# Patient Record
Sex: MALE | Race: WHITE | Employment: OTHER | URBAN - METROPOLITAN AREA
[De-identification: names, ages, dates, MRNs, and addresses within clinical notes are randomized per-mention and may not be internally consistent; named-entity substitution may affect disease eponyms.]

---

## 2020-08-14 NOTE — PROGRESS NOTES
Spoke with  at [facility] regarding COVID-19 testing needed prior to procedure on 08/19/2020. Nurse stated it was cancelled because COVID test done to early. Procedure date rescheduled to Wednesday 8/26/2020.

## 2020-08-25 ENCOUNTER — ANESTHESIA EVENT (OUTPATIENT)
Dept: ENDOSCOPY | Age: 75
End: 2020-08-25
Payer: MEDICARE

## 2020-08-25 NOTE — ANESTHESIA PRE PROCEDURE
Department of Anesthesiology  Preprocedure Note       Name:  Rox Rutherford   Age:  70 y.o.  :  1945                                          MRN:  [id number]         Date:  2020      Surgeon:  Colby Villafana MD    Procedure: SCREENING COLON W/ANES.     HPI:  The patient is a 73 y.o. male NH resident with PMH brainstem CVA, HTN, DM. Medications prior to admission:    losartan (COZAAR) 50 MG tablet Take 2 tablets by mouth daily   bismuth subsalicylate (PEPTO BISMOL) 262 MG/15ML suspension Take 30 mLs by mouth every 4 hours as needed for Nausea (and vomiting)   artificial tears (ARTIFICIAL TEARS) OINT Apply to eye 2 times daily Apply to left eye twice daily. QUEtiapine (SEROQUEL) 50 MG tablet Take 50 mg by mouth nightly   glucose (GLUTOSE) 40 % GEL Take 15 g by mouth as needed   fexofenadine (ALLEGRA) 180 MG tablet Take 180 mg by mouth as needed   metFORMIN (GLUCOPHAGE) 1000 MG tablet Take 1,000 mg by mouth 2 times daily (with meals)   amLODIPine (NORVASC) 10 MG tablet Take 1 tablet by mouth daily. loratadine (CLARITIN) 10 MG tablet Take 10 mg by mouth daily. Acetaminophen (TYLENOL PO) Take 650 mg by mouth 4 times daily. As needed    bisacodyl (DULCOLAX) 5 MG EC tablet Take 10 mg by mouth daily as needed. glucagon hcl, rDNA, (GLUCAGEN HYPOKIT) 1 MG SOLR Inject 1 mg into the muscle once. Indications: as needed for FBSS <60   aspirin EC 81 MG EC tablet Take 81 mg by mouth daily. citalopram (CELEXA) 20 MG tablet Take 40 mg by mouth daily    glyBURIDE (DIABETA) 5 MG tablet Take 5 mg by mouth 2 times daily (with meals). hydrochlorothiazide (MICROZIDE) 12.5 MG capsule Take 12.5 mg by mouth daily. omeprazole (PRILOSEC) 20 MG capsule Take 20 mg by mouth daily. senna (SENNA CONCENTRATE) 8.6 MG tablet Take 2 tablets by mouth daily. Insulin Regular Human (NOVOLIN R INNOLET IJ) Inject  as directed.  Per sliding scale    insulin glargine (LANTUS) 100 UNIT/ML injection Inject 48 Units into the skin nightly    Vitamin E-Vit  A & D OINT Apply 1 each topically. q shift and as needed    divalproex (DEPAKOTE) 250 MG EC tablet Take 250 mg by mouth 2 times daily.      Allergies:  No Known Allergies    Problem List:     Cerebral artery occlusion with [facility] infarction [facility]    GERD (gastroesophageal reflux disease)    Hypertension    Depressed    Back pain, chronic    Diabetes mellitus type 2, uncontrolled ([facility]    HCAP (healthcare-associated pneumonia)    Fever    Rash    Metabolic encephalopathy    Altered mental status    [facility]    Sepsis due to Escherichia coli [facility])     Past Medical History:     Back pain, chronic     Bipolar 1 disorder ([facility] 12/16/2010    Cancer ([facility]     prostate    CVA (cerebral infarction)     brain stem CVA    Depressed     Diabetes mellitus type 2, uncontrolled (HCC)     Erosive esophagitis     GERD (gastroesophageal reflux disease)     Hydrocephalus     Hypertension     Influenza A 0/48/08    Metabolic encephalopathy 92/52/2281    Organic affective disorder     Organic delusional disorder     Unspecified cerebral artery occlusion with cerebral infarction ([facility]      Past Surgical History:     CRANIOTOMY      PROSTATECTOMY       Social History:    Tobacco Use    Smoking status: Never Smoker   Substance Use Topics    Alcohol use: No     Vital Signs (Current):    BP: 147/75  Pulse: 72    Resp: 16  SpO2: 95    Temp: 97 °F (36.1 °C)    Height: 6' (1.829 m)  (08/26/20)  Weight: 214 lb (97.1 kg)  (08/26/20)    BMI: 29.1            BP Readings from Last 3 Encounters:   12/05/16 (!) 149/84   04/17/16 146/78     NPO Status: >8 hrs                         BMI:   Wt Readings from Last 3 Encounters:   12/05/16 205 lb 1.6 oz (93 kg)       COVID-19 Screening (If Applicable): negative    Anesthesia Evaluation  Patient summary reviewed and Nursing notes reviewed  Airway: Mallampati: III  TM distance: >3 FB   Neck ROM: limited  Mouth opening: > = 3 FB Dental:          Pulmonary:                              Cardiovascular:  Exercise tolerance: poor (<4 METS),   (+) hypertension:,                   Neuro/Psych:   (+) CVA:, psychiatric history:             ROS comment: + PMH brainstem CVA    Bipolar D/O GI/Hepatic/Renal:   (+) GERD:, PUD,           Endo/Other:    (+) DiabetesType II DM, , .                 Abdominal:           Vascular:                                      Anesthesia Plan      general and TIVA     ASA 4       Induction: intravenous. Anesthetic plan and risks discussed with patient. Plan discussed with CRNA.             To Oreilly MD

## 2020-08-25 NOTE — PROGRESS NOTES
PAT call for Endoscopy procedure scheduled for 8/27/2020. Spoke with Amy at [facility] regarding arrival time of 0900, NPO after am dose prep completed. [facility] will be faxing COVID test results and medication list to department, fax number provided.

## 2020-08-26 ENCOUNTER — ANESTHESIA (OUTPATIENT)
Dept: ENDOSCOPY | Age: 75
End: 2020-08-26
Payer: MEDICARE

## 2020-08-26 ENCOUNTER — HOSPITAL ENCOUNTER (OUTPATIENT)
Age: 75
Setting detail: OUTPATIENT SURGERY
Discharge: HOME | End: 2020-08-26
Attending: INTERNAL MEDICINE | Admitting: INTERNAL MEDICINE
Payer: MEDICARE

## 2020-08-26 VITALS
SYSTOLIC BLOOD PRESSURE: 158 MMHG | WEIGHT: 214 LBS | HEART RATE: 80 BPM | HEIGHT: 72 IN | DIASTOLIC BLOOD PRESSURE: 77 MMHG | OXYGEN SATURATION: 95 % | TEMPERATURE: 98.1 F | RESPIRATION RATE: 14 BRPM | BODY MASS INDEX: 28.99 KG/M2

## 2020-08-26 LAB
GLUCOSE BLD-MCNC: 149 MG/DL (ref 70–99)
PERFORMED ON: ABNORMAL

## 2020-08-26 PROCEDURE — 7100000010 HC PHASE II RECOVERY - FIRST 15 MIN: Performed by: INTERNAL MEDICINE

## 2020-08-26 PROCEDURE — 7100000011 HC PHASE II RECOVERY - ADDTL 15 MIN: Performed by: INTERNAL MEDICINE

## 2020-08-26 PROCEDURE — 6360000002 HC RX W HCPCS: Performed by: NURSE ANESTHETIST, CERTIFIED REGISTERED

## 2020-08-26 PROCEDURE — 3609010600 HC COLONOSCOPY POLYPECTOMY SNARE/COLD BIOPSY: Performed by: INTERNAL MEDICINE

## 2020-08-26 PROCEDURE — 3700000000 HC ANESTHESIA ATTENDED CARE: Performed by: INTERNAL MEDICINE

## 2020-08-26 PROCEDURE — 3700000001 HC ADD 15 MINUTES (ANESTHESIA): Performed by: INTERNAL MEDICINE

## 2020-08-26 PROCEDURE — 2580000003 HC RX 258: Performed by: ANESTHESIOLOGY

## 2020-08-26 PROCEDURE — 2500000003 HC RX 250 WO HCPCS: Performed by: NURSE ANESTHETIST, CERTIFIED REGISTERED

## 2020-08-26 PROCEDURE — 88305 TISSUE EXAM BY PATHOLOGIST: CPT

## 2020-08-26 PROCEDURE — 2709999900 HC NON-CHARGEABLE SUPPLY: Performed by: INTERNAL MEDICINE

## 2020-08-26 RX ORDER — SODIUM CHLORIDE, SODIUM LACTATE, POTASSIUM CHLORIDE, CALCIUM CHLORIDE 600; 310; 30; 20 MG/100ML; MG/100ML; MG/100ML; MG/100ML
INJECTION, SOLUTION INTRAVENOUS CONTINUOUS
Status: DISCONTINUED | OUTPATIENT
Start: 2020-08-26 | End: 2020-08-26 | Stop reason: HOSPADM

## 2020-08-26 RX ORDER — MECLIZINE HCL 12.5 MG/1
12.5 TABLET ORAL 3 TIMES DAILY PRN
COMMUNITY

## 2020-08-26 RX ORDER — LIDOCAINE HYDROCHLORIDE 10 MG/ML
0.3 INJECTION, SOLUTION EPIDURAL; INFILTRATION; INTRACAUDAL; PERINEURAL
Status: DISCONTINUED | OUTPATIENT
Start: 2020-08-26 | End: 2020-08-26 | Stop reason: HOSPADM

## 2020-08-26 RX ORDER — SODIUM CHLORIDE 0.9 % (FLUSH) 0.9 %
10 SYRINGE (ML) INJECTION PRN
Status: DISCONTINUED | OUTPATIENT
Start: 2020-08-26 | End: 2020-08-26 | Stop reason: HOSPADM

## 2020-08-26 RX ORDER — SODIUM CHLORIDE 0.9 % (FLUSH) 0.9 %
10 SYRINGE (ML) INJECTION EVERY 12 HOURS SCHEDULED
Status: DISCONTINUED | OUTPATIENT
Start: 2020-08-26 | End: 2020-08-26 | Stop reason: HOSPADM

## 2020-08-26 RX ORDER — LIDOCAINE HYDROCHLORIDE 20 MG/ML
INJECTION, SOLUTION EPIDURAL; INFILTRATION; INTRACAUDAL; PERINEURAL PRN
Status: DISCONTINUED | OUTPATIENT
Start: 2020-08-26 | End: 2020-08-26 | Stop reason: SDUPTHER

## 2020-08-26 RX ORDER — SODIUM CHLORIDE, SODIUM LACTATE, POTASSIUM CHLORIDE, CALCIUM CHLORIDE 600; 310; 30; 20 MG/100ML; MG/100ML; MG/100ML; MG/100ML
INJECTION, SOLUTION INTRAVENOUS CONTINUOUS
Status: DISCONTINUED | OUTPATIENT
Start: 2020-08-26 | End: 2020-08-26 | Stop reason: DRUGHIGH

## 2020-08-26 RX ORDER — PROPOFOL 10 MG/ML
INJECTION, EMULSION INTRAVENOUS PRN
Status: DISCONTINUED | OUTPATIENT
Start: 2020-08-26 | End: 2020-08-26 | Stop reason: SDUPTHER

## 2020-08-26 RX ORDER — TRAMADOL HYDROCHLORIDE 50 MG/1
50 TABLET ORAL EVERY 6 HOURS PRN
COMMUNITY

## 2020-08-26 RX ORDER — SODIUM PHOSPHATE, DIBASIC AND SODIUM PHOSPHATE, MONOBASIC 7; 19 G/133ML; G/133ML
1 ENEMA RECTAL
COMMUNITY

## 2020-08-26 RX ORDER — ALBUTEROL SULFATE 90 UG/1
2 AEROSOL, METERED RESPIRATORY (INHALATION) EVERY 6 HOURS PRN
COMMUNITY

## 2020-08-26 RX ORDER — GUAIFENESIN 100 MG/5ML
200 SOLUTION ORAL EVERY 4 HOURS PRN
COMMUNITY

## 2020-08-26 RX ORDER — SERTRALINE HYDROCHLORIDE 100 MG/1
100 TABLET, FILM COATED ORAL DAILY
COMMUNITY

## 2020-08-26 RX ADMIN — LIDOCAINE HYDROCHLORIDE 50 MG: 20 INJECTION, SOLUTION EPIDURAL; INFILTRATION; INTRACAUDAL; PERINEURAL at 10:12

## 2020-08-26 RX ADMIN — PROPOFOL 20 MG: 10 INJECTION, EMULSION INTRAVENOUS at 10:34

## 2020-08-26 RX ADMIN — SODIUM CHLORIDE, POTASSIUM CHLORIDE, SODIUM LACTATE AND CALCIUM CHLORIDE: 600; 310; 30; 20 INJECTION, SOLUTION INTRAVENOUS at 10:09

## 2020-08-26 RX ADMIN — PROPOFOL 100 MG: 10 INJECTION, EMULSION INTRAVENOUS at 10:23

## 2020-08-26 RX ADMIN — PROPOFOL 200 MG: 10 INJECTION, EMULSION INTRAVENOUS at 10:12

## 2020-08-26 ASSESSMENT — PAIN SCALES - GENERAL
PAINLEVEL_OUTOF10: 0
PAINLEVEL_OUTOF10: 0

## 2020-08-26 ASSESSMENT — PAIN - FUNCTIONAL ASSESSMENT: PAIN_FUNCTIONAL_ASSESSMENT: 0-10

## 2020-08-26 NOTE — OP NOTE
was performed to obtain adequate views. FINDINGS:  RECTUM:  The digital rectal exam was normal.  No masses were felt. COLON:  The visualized terminal ileum appeared normal.  There were three  semi-pedunculated 5 to 6 mm polyps in the ascending colon. All three  polyps were completely resected using snare polypectomy method. There  were six semi-pedunculated and sessile 4 to 6 mm polyps in the  transverse colon. All six polyps were completely resected using snare  polypectomy method. There was a large 1.2 cm semi-pedunculated polyp in  the descending colon. This was completely resected using hot snare  polypectomy method. There were two additional smaller 5 mm sessile  polyps in the sigmoid colon, also completely resected using snare  polypectomy method and placed in same specimen jar. The remaining  examined colon mucosa was examined and found to be normal without any  evidence of inflammation, ulcers, pseudomembranes, or masses. Retroflexed view of the rectum showed internal hemorrhoids. The entire  exam was limited by fair prep. SUMMARY  1.  Multiple medium-to-large colon polyps, resected and retrieved. 2.  Internal hemorrhoids. 3.  Fair prep. RECOMMENDATIONS:  1. Discharge the patient to home when standard parameters are met. 2.  Await pathology results. 3.  Avoid NSAIDs for 2 weeks. 4.  Repeat colonoscopy in 1 year. 5.  Follow up as needed.     EBL: < 5mL    Gilma Suarez MD    D: 08/26/2020 11:06:00       T: 08/26/2020 11:15:08     [email or web address]  Job#: [id number]     Doc#: [id number]    CC:  Jyoti Bourgeois MD

## 2020-08-26 NOTE — H&P
History and Physical / Pre-Sedation Assessment    Patient:  Jono Guzman   :   1945     Intended Procedure:  Colonoscopy    HPI: 70year old male with DM, GERD, CVA, bipolar do and depression presents for surveillance colonoscopy    Past Medical History:   Diagnosis Date    Back pain, chronic     B[facility] 1 disorder ([facility] 2010    Cancer ([facility]     prostate    CVA (cerebral infarction)     brain stem CVA    Depressed     Diabetes mellitus type 2, uncontrolled ([facility]     Erosive esophagitis     GERD (gastroesophageal reflux disease)     Hydrocephalus ([facility]     Hypertension     Influenza A     Metabolic encephalopathy     Organic affective disorder     Organic delusional disorder     Unspecified cerebral artery occlusion with cerebral infarction      Past Surgical History:   Procedure Laterality Date    CRANIOTOMY      PROSTATECTOMY         Medications reviewed  Prior to Admission medications    Medication Sig Start Date End Date Taking? Authorizing Provider   Sodium Phosphates (FLEET) 7-19 GM/118ML Place 1 enema rectally once as needed   Yes Historical Provider, MD   guaiFENesin (ROBITUSSIN) 100 MG/5ML SOLN oral solution Take 200 mg by mouth every 4 hours as needed for Cough   Yes Historical Provider, MD   meclizine (ANTIVERT) 12.5 MG tablet Take 12.5 mg by mouth 3 times daily as needed   Yes Historical Provider, MD   magnesium hydroxide (MILK OF MAGNESIA) 400 MG/5ML suspension Take by mouth daily as needed for Constipation   Yes Historical Provider, MD   insulin aspart (NOVOLOG) 100 UNIT/ML injection vial Inject 20 Units into the skin 3 times daily (before meals)   Yes Historical Provider, MD   sertraline (ZOLOFT) 100 MG tablet Take 100 mg by mouth daily   Yes Historical Provider, MD   traMADol (ULTRAM) 50 MG tablet Take 50 mg by mouth every 6 hours as needed for Pain.    Yes Historical Provider, MD   albuterol sulfate HFA (VENTOLIN HFA) 108 (90 Base) MCG/ACT inhaler patient with severe systemic disease that limits activity but is not incapacitating  Level of Sedation Plan: Moderate sedation  Post Procedure plan: Return to same level of care    I assessed the patient and find that the patient is in satisfactory condition to proceed with the planned procedure and sedation plan. I have explained the risk, benefits, and alternatives to the procedure; the patient understands and agrees to proceed.        Sarah Stevenson  8/26/2020

## 2020-08-26 NOTE — ANESTHESIA POSTPROCEDURE EVALUATION
Department of Anesthesiology  Postprocedure Note    Patient: Marivel Durant  MRN: [id number]  YOB: 1945  Date of evaluation: 8/26/2020    Procedure Summary     Date:  08/26/20 Room / Location:  [facility] 01 / [facility]    Anesthesia Start:  1008 Anesthesia Stop:  1050    Procedure:  COLONOSCOPY POLYPECTOMY SNARE/COLD BIOPSY (N/A ) Diagnosis:       Family history of colon cancer      (FAMILY HX COLON CA)    Surgeon:  Donald Brantley MD Responsible Provider:  Chinyere Preciado MD    Anesthesia Type:  general, TIVA ASA Status:  4        Anesthesia Type: general, TIVA    John Phase I: John Score: 9    John Phase II: John Score: 9    Last vitals: Reviewed and per EMR flowsheets.      Anesthesia Post Evaluation   Anesthetic Problems: no   Cardiovascular System Stable: yes  Respiratory Function: Airway Patent yes  ETT no  Ventilator no  Level of consciousness: awake, alert and oriented  Post-op pain: adequate analgesia  Hydration Adequate: yes  Nausea/Vomiting:no  Other Issues:     Nicolas Moreira MD

## 2020-08-26 NOTE — BRIEF OP NOTE
Brief Postoperative Note      Patient: Arline Hashimoto  YOB: 1945  MRN: [id number]    Date of Procedure: 8/26/2020    Pre-Op Diagnosis: FAMILY HX COLON CA    Post-Op Diagnosis: Multiple colon polyps (11) resected and retrieved; Fair prep       Procedure(s):  COLONOSCOPY POLYPECTOMY SNARE/COLD BIOPSY    Surgeon(s):  Alvaro Camarillo MD    Assistant:  * No surgical staff found *    Anesthesia: Monitor Anesthesia Care    Estimated Blood Loss (mL): Minimal    Complications: None    Specimens:   ID Type Source Tests Collected by Time Destination   A :  Tissue Biopsy SURGICAL PATHOLOGY Alvaro Camarillo MD 8/26/2020 1021        Implants:  * No implants in log *      Drains: * No LDAs found *    Findings: Multiple colon polyps (11) resected and retrieved;  Fair prep    Electronically signed by Alvaro Camarillo MD on 8/26/2020 at 10:38 AM

## 2020-08-26 NOTE — PROGRESS NOTES
Bedside report received from [address], pt resting in bed no distress note. Pam Laguerre

## 2020-09-09 ENCOUNTER — APPOINTMENT (OUTPATIENT)
Dept: GENERAL RADIOLOGY | Age: 75
End: 2020-09-09
Payer: MEDICARE

## 2020-09-09 ENCOUNTER — HOSPITAL ENCOUNTER (EMERGENCY)
Age: 75
Discharge: ANOTHER HOSPITAL | End: 2020-09-09
Attending: STUDENT IN AN ORGANIZED HEALTH CARE EDUCATION/TRAINING PROGRAM | Admitting: INTERNAL MEDICINE
Payer: MEDICARE

## 2020-09-09 ENCOUNTER — APPOINTMENT (OUTPATIENT)
Dept: CT IMAGING | Age: 75
End: 2020-09-09
Payer: MEDICARE

## 2020-09-09 VITALS
BODY MASS INDEX: 28.99 KG/M2 | RESPIRATION RATE: 19 BRPM | OXYGEN SATURATION: 97 % | WEIGHT: 214 LBS | HEART RATE: 105 BPM | DIASTOLIC BLOOD PRESSURE: 73 MMHG | TEMPERATURE: 98.6 F | SYSTOLIC BLOOD PRESSURE: 153 MMHG | HEIGHT: 72 IN

## 2020-09-09 PROBLEM — J96.01 ACUTE RESPIRATORY FAILURE WITH HYPOXIA AND HYPERCAPNIA (HCC): Status: ACTIVE | Noted: 2020-09-09

## 2020-09-09 PROBLEM — J96.02 ACUTE RESPIRATORY FAILURE WITH HYPOXIA AND HYPERCAPNIA (HCC): Status: ACTIVE | Noted: 2020-09-09

## 2020-09-09 LAB
A/G RATIO: 1.2 (ref 1.1–2.2)
ALBUMIN SERPL-MCNC: 4.3 G/DL (ref 3.4–5)
ALP BLD-CCNC: 60 U/L (ref 40–129)
ALT SERPL-CCNC: 55 U/L (ref 10–40)
AMMONIA: 32 UMOL/L (ref 16–60)
AMORPHOUS: ABNORMAL /HPF
ANION GAP SERPL CALCULATED.3IONS-SCNC: 13 MMOL/L (ref 3–16)
AST SERPL-CCNC: 59 U/L (ref 15–37)
BACTERIA: ABNORMAL /HPF
BASE EXCESS ARTERIAL: -3.1 MMOL/L (ref -3–3)
BASE EXCESS ARTERIAL: -5.9 MMOL/L (ref -3–3)
BASOPHILS ABSOLUTE: 0.1 K/UL (ref 0–0.2)
BASOPHILS RELATIVE PERCENT: 1.4 %
BILIRUB SERPL-MCNC: 0.3 MG/DL (ref 0–1)
BILIRUBIN URINE: NEGATIVE
BLOOD, URINE: NEGATIVE
BUN BLDV-MCNC: 31 MG/DL (ref 7–20)
CALCIUM SERPL-MCNC: 9.1 MG/DL (ref 8.3–10.6)
CARBOXYHEMOGLOBIN ARTERIAL: 0.3 % (ref 0–1.5)
CARBOXYHEMOGLOBIN ARTERIAL: 0.4 % (ref 0–1.5)
CHLORIDE BLD-SCNC: 100 MMOL/L (ref 99–110)
CLARITY: ABNORMAL
CO2: 28 MMOL/L (ref 21–32)
COLOR: YELLOW
CREAT SERPL-MCNC: 1.7 MG/DL (ref 0.8–1.3)
EKG ATRIAL RATE: 109 BPM
EKG DIAGNOSIS: NORMAL
EKG P AXIS: 45 DEGREES
EKG P-R INTERVAL: 156 MS
EKG Q-T INTERVAL: 348 MS
EKG QRS DURATION: 74 MS
EKG QTC CALCULATION (BAZETT): 468 MS
EKG R AXIS: -8 DEGREES
EKG T AXIS: 23 DEGREES
EKG VENTRICULAR RATE: 109 BPM
EOSINOPHILS ABSOLUTE: 0 K/UL (ref 0–0.6)
EOSINOPHILS RELATIVE PERCENT: 0.2 %
EPITHELIAL CELLS, UA: ABNORMAL /HPF (ref 0–5)
GFR AFRICAN AMERICAN: 48
GFR NON-AFRICAN AMERICAN: 40
GLOBULIN: 3.6 G/DL
GLUCOSE BLD-MCNC: 238 MG/DL (ref 70–99)
GLUCOSE URINE: NEGATIVE MG/DL
HCO3 ARTERIAL: 23 MMOL/L (ref 21–29)
HCO3 ARTERIAL: 25.9 MMOL/L (ref 21–29)
HCT VFR BLD CALC: 42.7 % (ref 40.5–52.5)
HEMOGLOBIN, ART, EXTENDED: 13.5 G/DL (ref 13.5–17.5)
HEMOGLOBIN, ART, EXTENDED: 14.3 G/DL (ref 13.5–17.5)
HEMOGLOBIN: 14.3 G/DL (ref 13.5–17.5)
KETONES, URINE: NEGATIVE MG/DL
LACTIC ACID: 3 MMOL/L (ref 0.4–2)
LEUKOCYTE ESTERASE, URINE: NEGATIVE
LIPASE: 39 U/L (ref 13–60)
LYMPHOCYTES ABSOLUTE: 1.2 K/UL (ref 1–5.1)
LYMPHOCYTES RELATIVE PERCENT: 24.6 %
MCH RBC QN AUTO: 31.8 PG (ref 26–34)
MCHC RBC AUTO-ENTMCNC: 33.4 G/DL (ref 31–36)
MCV RBC AUTO: 95.4 FL (ref 80–100)
METHEMOGLOBIN ARTERIAL: 0.3 %
METHEMOGLOBIN ARTERIAL: 0.3 %
MICROSCOPIC EXAMINATION: YES
MONOCYTES ABSOLUTE: 0.6 K/UL (ref 0–1.3)
MONOCYTES RELATIVE PERCENT: 12.1 %
NEUTROPHILS ABSOLUTE: 3.1 K/UL (ref 1.7–7.7)
NEUTROPHILS RELATIVE PERCENT: 61.7 %
NITRITE, URINE: NEGATIVE
O2 CONTENT ARTERIAL: 18 ML/DL
O2 CONTENT ARTERIAL: 19 ML/DL
O2 SAT, ARTERIAL: 92.2 %
O2 SAT, ARTERIAL: 95.6 %
O2 THERAPY: ABNORMAL
O2 THERAPY: ABNORMAL
PCO2 ARTERIAL: 61 MMHG (ref 35–45)
PCO2 ARTERIAL: 64.1 MMHG (ref 35–45)
PDW BLD-RTO: 14.2 % (ref 12.4–15.4)
PH ARTERIAL: 7.2 (ref 7.35–7.45)
PH ARTERIAL: 7.22 (ref 7.35–7.45)
PH UA: 5 (ref 5–8)
PLATELET # BLD: 192 K/UL (ref 135–450)
PMV BLD AUTO: 9.2 FL (ref 5–10.5)
PO2 ARTERIAL: 76 MMHG (ref 75–108)
PO2 ARTERIAL: 96.6 MMHG (ref 75–108)
POTASSIUM REFLEX MAGNESIUM: 4.4 MMOL/L (ref 3.5–5.1)
PRO-BNP: 117 PG/ML (ref 0–124)
PROCALCITONIN: 0.14 NG/ML (ref 0–0.15)
PROTEIN UA: 30 MG/DL
RBC # BLD: 4.48 M/UL (ref 4.2–5.9)
RBC UA: ABNORMAL /HPF (ref 0–4)
SODIUM BLD-SCNC: 141 MMOL/L (ref 136–145)
SPECIFIC GRAVITY UA: >=1.03 (ref 1–1.03)
TCO2 ARTERIAL: 24.9 MMOL/L
TCO2 ARTERIAL: 27.8 MMOL/L
TOTAL PROTEIN: 7.9 G/DL (ref 6.4–8.2)
TROPONIN: <0.01 NG/ML
URINE REFLEX TO CULTURE: ABNORMAL
URINE TYPE: ABNORMAL
UROBILINOGEN, URINE: 0.2 E.U./DL
WBC # BLD: 5.1 K/UL (ref 4–11)
WBC UA: ABNORMAL /HPF (ref 0–5)

## 2020-09-09 PROCEDURE — 74176 CT ABD & PELVIS W/O CONTRAST: CPT

## 2020-09-09 PROCEDURE — 36600 WITHDRAWAL OF ARTERIAL BLOOD: CPT

## 2020-09-09 PROCEDURE — 87040 BLOOD CULTURE FOR BACTERIA: CPT

## 2020-09-09 PROCEDURE — 71045 X-RAY EXAM CHEST 1 VIEW: CPT

## 2020-09-09 PROCEDURE — 96375 TX/PRO/DX INJ NEW DRUG ADDON: CPT

## 2020-09-09 PROCEDURE — 6360000002 HC RX W HCPCS: Performed by: STUDENT IN AN ORGANIZED HEALTH CARE EDUCATION/TRAINING PROGRAM

## 2020-09-09 PROCEDURE — 6360000002 HC RX W HCPCS: Performed by: NURSE PRACTITIONER

## 2020-09-09 PROCEDURE — 82140 ASSAY OF AMMONIA: CPT

## 2020-09-09 PROCEDURE — 84145 PROCALCITONIN (PCT): CPT

## 2020-09-09 PROCEDURE — 99291 CRITICAL CARE FIRST HOUR: CPT

## 2020-09-09 PROCEDURE — 81001 URINALYSIS AUTO W/SCOPE: CPT

## 2020-09-09 PROCEDURE — 80053 COMPREHEN METABOLIC PANEL: CPT

## 2020-09-09 PROCEDURE — 36415 COLL VENOUS BLD VENIPUNCTURE: CPT

## 2020-09-09 PROCEDURE — 85025 COMPLETE CBC W/AUTO DIFF WBC: CPT

## 2020-09-09 PROCEDURE — 83880 ASSAY OF NATRIURETIC PEPTIDE: CPT

## 2020-09-09 PROCEDURE — 83605 ASSAY OF LACTIC ACID: CPT

## 2020-09-09 PROCEDURE — 6360000002 HC RX W HCPCS

## 2020-09-09 PROCEDURE — 83690 ASSAY OF LIPASE: CPT

## 2020-09-09 PROCEDURE — 96366 THER/PROPH/DIAG IV INF ADDON: CPT

## 2020-09-09 PROCEDURE — 2500000003 HC RX 250 WO HCPCS: Performed by: STUDENT IN AN ORGANIZED HEALTH CARE EDUCATION/TRAINING PROGRAM

## 2020-09-09 PROCEDURE — 96365 THER/PROPH/DIAG IV INF INIT: CPT

## 2020-09-09 PROCEDURE — 84484 ASSAY OF TROPONIN QUANT: CPT

## 2020-09-09 PROCEDURE — 99285 EMERGENCY DEPT VISIT HI MDM: CPT

## 2020-09-09 PROCEDURE — 82803 BLOOD GASES ANY COMBINATION: CPT

## 2020-09-09 PROCEDURE — 51701 INSERT BLADDER CATHETER: CPT

## 2020-09-09 PROCEDURE — 70450 CT HEAD/BRAIN W/O DYE: CPT

## 2020-09-09 PROCEDURE — 1200000000 HC SEMI PRIVATE

## 2020-09-09 PROCEDURE — 93010 ELECTROCARDIOGRAM REPORT: CPT | Performed by: INTERNAL MEDICINE

## 2020-09-09 PROCEDURE — 31500 INSERT EMERGENCY AIRWAY: CPT

## 2020-09-09 PROCEDURE — 93005 ELECTROCARDIOGRAM TRACING: CPT | Performed by: NURSE PRACTITIONER

## 2020-09-09 PROCEDURE — 2500000003 HC RX 250 WO HCPCS

## 2020-09-09 PROCEDURE — 96367 TX/PROPH/DG ADDL SEQ IV INF: CPT

## 2020-09-09 PROCEDURE — U0003 INFECTIOUS AGENT DETECTION BY NUCLEIC ACID (DNA OR RNA); SEVERE ACUTE RESPIRATORY SYNDROME CORONAVIRUS 2 (SARS-COV-2) (CORONAVIRUS DISEASE [COVID-19]), AMPLIFIED PROBE TECHNIQUE, MAKING USE OF HIGH THROUGHPUT TECHNOLOGIES AS DESCRIBED BY CMS-2020-01-R: HCPCS

## 2020-09-09 PROCEDURE — 2580000003 HC RX 258: Performed by: NURSE PRACTITIONER

## 2020-09-09 RX ORDER — NALOXONE HYDROCHLORIDE 0.4 MG/ML
0.4 INJECTION, SOLUTION INTRAMUSCULAR; INTRAVENOUS; SUBCUTANEOUS ONCE
Status: COMPLETED | OUTPATIENT
Start: 2020-09-09 | End: 2020-09-09

## 2020-09-09 RX ORDER — ONDANSETRON 2 MG/ML
4 INJECTION INTRAMUSCULAR; INTRAVENOUS EVERY 6 HOURS PRN
Status: CANCELLED | OUTPATIENT
Start: 2020-09-09

## 2020-09-09 RX ORDER — ETOMIDATE 2 MG/ML
INJECTION INTRAVENOUS DAILY PRN
Status: COMPLETED | OUTPATIENT
Start: 2020-09-09 | End: 2020-09-09

## 2020-09-09 RX ORDER — PROMETHAZINE HYDROCHLORIDE 25 MG/1
12.5 TABLET ORAL EVERY 6 HOURS PRN
Status: CANCELLED | OUTPATIENT
Start: 2020-09-09

## 2020-09-09 RX ORDER — IPRATROPIUM BROMIDE AND ALBUTEROL SULFATE 2.5; .5 MG/3ML; MG/3ML
1 SOLUTION RESPIRATORY (INHALATION) ONCE
Status: DISCONTINUED | OUTPATIENT
Start: 2020-09-09 | End: 2020-09-09 | Stop reason: HOSPADM

## 2020-09-09 RX ORDER — ACETAMINOPHEN 325 MG/1
650 TABLET ORAL EVERY 6 HOURS PRN
Status: CANCELLED | OUTPATIENT
Start: 2020-09-09

## 2020-09-09 RX ORDER — SODIUM CHLORIDE 9 MG/ML
INJECTION, SOLUTION INTRAVENOUS CONTINUOUS
Status: CANCELLED | OUTPATIENT
Start: 2020-09-09

## 2020-09-09 RX ORDER — SODIUM CHLORIDE 0.9 % (FLUSH) 0.9 %
10 SYRINGE (ML) INJECTION PRN
Status: CANCELLED | OUTPATIENT
Start: 2020-09-09

## 2020-09-09 RX ORDER — 0.9 % SODIUM CHLORIDE 0.9 %
30 INTRAVENOUS SOLUTION INTRAVENOUS ONCE
Status: COMPLETED | OUTPATIENT
Start: 2020-09-09 | End: 2020-09-09

## 2020-09-09 RX ORDER — IPRATROPIUM BROMIDE AND ALBUTEROL SULFATE 2.5; .5 MG/3ML; MG/3ML
1 SOLUTION RESPIRATORY (INHALATION) EVERY 6 HOURS
COMMUNITY

## 2020-09-09 RX ORDER — AMLODIPINE BESYLATE 5 MG/1
10 TABLET ORAL DAILY
Status: CANCELLED | OUTPATIENT
Start: 2020-09-09

## 2020-09-09 RX ORDER — DEXTROSE MONOHYDRATE 25 G/50ML
12.5 INJECTION, SOLUTION INTRAVENOUS PRN
Status: CANCELLED | OUTPATIENT
Start: 2020-09-09

## 2020-09-09 RX ORDER — SODIUM CHLORIDE 0.9 % (FLUSH) 0.9 %
10 SYRINGE (ML) INJECTION EVERY 12 HOURS SCHEDULED
Status: CANCELLED | OUTPATIENT
Start: 2020-09-09

## 2020-09-09 RX ORDER — POLYETHYLENE GLYCOL 3350 17 G/17G
17 POWDER, FOR SOLUTION ORAL DAILY PRN
Status: CANCELLED | OUTPATIENT
Start: 2020-09-09

## 2020-09-09 RX ORDER — SODIUM CHLORIDE 9 MG/ML
INJECTION, SOLUTION INTRAVENOUS
Status: DISCONTINUED
Start: 2020-09-09 | End: 2020-09-09 | Stop reason: HOSPADM

## 2020-09-09 RX ORDER — ASPIRIN 81 MG/1
81 TABLET ORAL DAILY
Status: CANCELLED | OUTPATIENT
Start: 2020-09-09

## 2020-09-09 RX ORDER — CETIRIZINE HYDROCHLORIDE 10 MG/1
5 TABLET ORAL DAILY
Status: CANCELLED | OUTPATIENT
Start: 2020-09-09

## 2020-09-09 RX ORDER — NICOTINE POLACRILEX 4 MG
15 LOZENGE BUCCAL PRN
Status: CANCELLED | OUTPATIENT
Start: 2020-09-09

## 2020-09-09 RX ORDER — SUCCINYLCHOLINE CHLORIDE 20 MG/ML
INJECTION INTRAMUSCULAR; INTRAVENOUS DAILY PRN
Status: COMPLETED | OUTPATIENT
Start: 2020-09-09 | End: 2020-09-09

## 2020-09-09 RX ORDER — ACETAMINOPHEN 650 MG/1
650 SUPPOSITORY RECTAL EVERY 6 HOURS PRN
Status: CANCELLED | OUTPATIENT
Start: 2020-09-09

## 2020-09-09 RX ORDER — DIVALPROEX SODIUM 250 MG/1
250 TABLET, DELAYED RELEASE ORAL 2 TIMES DAILY
Status: CANCELLED | OUTPATIENT
Start: 2020-09-09

## 2020-09-09 RX ORDER — AZITHROMYCIN 250 MG/1
250 TABLET, FILM COATED ORAL DAILY
COMMUNITY

## 2020-09-09 RX ORDER — DEXTROSE MONOHYDRATE 50 MG/ML
100 INJECTION, SOLUTION INTRAVENOUS PRN
Status: CANCELLED | OUTPATIENT
Start: 2020-09-09

## 2020-09-09 RX ORDER — PANTOPRAZOLE SODIUM 40 MG/1
40 TABLET, DELAYED RELEASE ORAL
Status: CANCELLED | OUTPATIENT
Start: 2020-09-10

## 2020-09-09 RX ADMIN — SODIUM CHLORIDE 2913 ML: 9 INJECTION, SOLUTION INTRAVENOUS at 17:16

## 2020-09-09 RX ADMIN — PIPERACILLIN SODIUM AND TAZOBACTAM SODIUM 3.38 G: 3; .375 INJECTION, POWDER, LYOPHILIZED, FOR SOLUTION INTRAVENOUS at 17:22

## 2020-09-09 RX ADMIN — NALXONE HYDROCHLORIDE 0.4 MG: 0.4 INJECTION INTRAMUSCULAR; INTRAVENOUS; SUBCUTANEOUS at 17:15

## 2020-09-09 RX ADMIN — SUCCINYLCHOLINE CHLORIDE 100 MG: 20 INJECTION, SOLUTION INTRAMUSCULAR; INTRAVENOUS at 18:21

## 2020-09-09 RX ADMIN — ETOMIDATE INJECTION 20 MG: 2 SOLUTION INTRAVENOUS at 18:20

## 2020-09-09 RX ADMIN — VANCOMYCIN HYDROCHLORIDE 1000 MG: 1 INJECTION, POWDER, LYOPHILIZED, FOR SOLUTION INTRAVENOUS at 18:42

## 2020-09-09 NOTE — PROGRESS NOTES
Per ED patient will not be admitted here and will be sent to [facility] for ENT.

## 2020-09-09 NOTE — ED PROVIDER NOTES
I independently examined and evaluated Farideh Yeung. In brief, patient has a history of metabolic encephalopathy, hypertension, diabetes, CVA, and respiratory failure with previous tracheostomy who was sent in from nursing home for an episode of unresponsiveness today. Patient is unable to provide history due to extreme somnolence. History obtained from nursing home and patient's wife who is also his power of . Patient has had sore throat and mild cough since Saturday. He was treated with azithromycin. Patient is not typically on oxygen. He does have exposure to coronavirus positive contacts. Patient had a colonoscopy 1 week ago. Patient sent in for fever and unresponsive episode. T-max 101.7. Of note, in 2014 a similar episode of fever and unresponsiveness was noted. At that time patient was opening his eyes to verbal commands but not responding to commands. Altered mental status improved with treatment of pneumonia. Patient is a DNR CCA. Focused exam revealed tachycardia, regular rhythm. Lungs coarse bilaterally. Patient is snoring. Abdomen soft, patient does react when I press on the abdomen so possibly tender. No masses or obvious guarding. No lower extremity edema. Patient only responds to significant noxious stimuli (straight cath placement). No lower extremity edema. Disconjugate gaze (this is noted on previous physical exams). ED course:     Broad work-up obtained in the setting of extremely somnolent patient who cannot provide history. Patient has reportedly had fever and cough so he will receive a septic work-up. Coronavirus is also a possibility. We will also obtain head CT. ED Course as of Sep 09 2041   Wed Sep 09, 2020   1547 CXR:  IMPRESSION: Low lung volumes, resulting in limitations.     Bibasilar atelectasis. Given the increased opacity in the left base, superimposed pneumonia is considered.     [ER]   1548 Urinalysis without evidence of infection or blood    [ER]   1617 Troponin within normal limits. BNP within normal limits. [ER]   1617 Elevated creatinine, increased from baseline from 3 years ago. Low GFR. No Electrolyte abnormalities. [ER]   1618 Liver function testing shows mild transaminitis, no other abnormality. [ER]   1618 No leukocytosis, anemia, or thrombocytopenia. [ER]   1618 Lactate elevated at 3.    [ER]   1618 Lipase within normal limits. [ER]   1293 CT Head: IMPRESSION: No acute intracranial abnormality.     No significant change from prior study    [ER]   1627 Patient did wake up when nursing placed straight urinary catheter. Patient was conversant with nursing at that time. Nursing says that patient woke up and looked around and said hello to the people in the room. He then fell back to sleep. On my reassessment after being informed of this, patient is again unresponsive.    [ER]   1632 Arterial blood gas shows acidemia, hypercarbia. [ER]   2419 Will give a dose of Narcan to see if it improves mental status. [ER]   1644 Ammonia within normal limits. [ER]   1645 Pro calcitonin negative. [ER]   [phone] to [facility] stroke team.  They did state that overall, the intermittent nature of the patient's mental status is not consistent with stroke. The patient's mental status continued to be diminished and there was any evidence of seizure or posturing, the concern would be for a basilar artery or posterior circulation stroke and CTA would be indicated at that time. [ER]   [phone] Patient given Narcan, no improvement of mental status. [ER]   9429 Blood gas shows acidemia and hypercarbia. [ER]   1755 Urinalysis shows no evidence of infection or blood. [ER]   1800 Concern for altered mental status being related to hypercarbia. Patient's continued extreme somnolence makes me concerned that he is not an appropriate candidate for BiPAP.   Discussed with patient's wife, she wishes to have her  intubated at this time. Patient is unable to p discuss his wishes at this time despite multiple attempts to try to wake him. PAtient's wife is next of kin and medical power of . [ER]   6162 CT abd/pelvis: IMPRESSION:  Mild loss of height of L1 vertebral body compatible with compression  fracture, likely remote. Correlation is recommended.     Fatty infiltration of the liver.    [ER]   1937 Attempted intubation. 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 Had excellent visualization of the cords but could not pass even a 5.5 ET tube sufficiently through them. [ER]   1954 Repeat blood gas showed mildly improved hypercarbia but worsening acidosis. [ER]      ED Course User Index  [ER] Jesse James MD     Chest x-ray shows bibasilar opacities. Concern for pneumonia the setting of fever and cough. Patient started on empiric antibiotics. Patient received sepsis appropriate fluids. Lactate elevated at 3. No leukocytosis. Patient to be treated for pneumonia. Urinalysis did not show evidence of infection. Patient could also have bacteremia from other source including recent colonoscopy. Did obtain CT of the abdomen pelvis without contrast in setting of REGLA which did not show evidence of obvious intra-abdominal infection-no free air in the abdomen. Patient has been afebrile in the emergency department. Given cough, fever, respiratory failure, mild transaminitis- these could be consistent with the diagnosis of coronavirus. Patient symptoms could be related to delirium from infection. Other causes of somnolence evaluated. Patient received Narcan without improvement of mental status. Patient's ammonia is within normal limits making hepatic encephalopathy unlikely. He does not have any significant electrolyte abnormalities. His troponin is within normal limits and his EKG does not show evidence of ischemia.   His BNP is within normal limits, low suspicion for heart failure. His liver function test showed mild transaminitis, low suspicion for acute hepatitis or other acute liver pathology causing his altered mental status. Low suspicion for cholangitis. Lipase within normal limits, low suspicion for pancreatitis. Patient is not anemic or thrombocytopenic. Patient is hyperglycemic and there is no reports of hypoglycemia today. Low suspicion for HHS as nursing home reported blood sugar of 90 today. CT scan did not show any acute intracranial abnormality. Did review patient's medication list, did not appreciate anything that would cause acute mental status change. Patient does have elevated creatinine from previous baseline years ago. Patient is receiving IV fluids. Of note, patient did have episodes where he awoke after noxious stimuli and did not appear confused. For example, he was briefly conversant with nursing after straight cath where he said hello to the various providers. While this does not represent return to baseline, it suggests less for a acute intracranial pathology. Furthermore, patient's mental status did improve immediately prior to intubation attempt, patient was still confused and somnolent but he was more responsive than previous. Did consider meningitis, however patient had not had any reported headache or neck stiffness, lower suspicion this is the cause of his current symptoms. Also considered stroke, discussed with [facility] stroke team.  They stated that his brief episodes of waking up were not consistent with a stroke as a stroke that would cause severe mental status abnormalities would not wax and wane. We did discuss that if there is continued concern and other work-up negative, CTA may be appropriate to assess for basilar or vertebral artery abnormality. Either way, patient would not be a candidate for acute intervention. Blood gas obtained which did show acidemia and hypercarbia. Patient has no history of COPD so believe this represents an acute respiratory failure. This may be the cause of his somnolence. Given the Joslyn somnolence, did not feel that he was an appropriate candidate for BiPAP due to altered mentation. Did attempt intubation but was unsuccessful, do believe this was due to subglottic stenosis in a patient with previous tracheostomy. repeat blood gas showed worsening acidemia but mildly improved hypercarbia. Did discuss with ICU team at [facility], they did not have particular recommendations regarding blood gas results, however they did feel that patient should be transferred to a facility that has around-the-clock surgical airway assistance given lack of success with intubation in the emergency department. Spoke to the ICU team at [facility], they also did not have any specific recommendations in the setting of BiPAP concerns in a somnolent patient. They did accept the patient for transfer to their facility. CT scan did show a likely remote L1 vertebral body compression fracture. This will need to be discussed with the patient once mental status improves. At this time, the exact cause of the patient's symptoms is unclear. Patient does require further ICU level of care for further work-up and management. I spent a total of 50 minutes of critical care time in obtaining history, performing a physical exam, bedside monitoring of interventions, collecting and interpreting tests and discussion with consultants but not including time spent performing procedures. 7:53 PM   I have signed out [facility] Emergency Department care to Dr. Karina Zambrano awaiting transfer to [facility]. We discussed the pertinent history, physical exam, completed/pending test results (if applicable) and current treatment plan. Please refer to his/her chart for the patients remaining Emergency Department course and final disposition.     All diagnostic, treatment, and disposition decisions were made by myself in conjunction with the advanced practice provider. For all further details of the patient's emergency department visit, please see the advanced practice provider's documentation. Comment: Please note this report has been produced using speech recognition software and may contain errors related to that system including errors in grammar, punctuation, and spelling, as well as words and phrases that may be inappropriate. If there are any questions or concerns please feel free to contact the dictating provider for clarification. Savanna Sandoval MD  09/09/20 5727    The Ekg interpreted by me shows  sinus tachycardia, dppe=130   Axis is   Left axis deviation  QTc is  within an acceptable range  Intervals and Durations are unremarkable.       ST Segments: no acute change  No significant change from prior EKG dated 12/3/16           Savanna Sandoval MD  09/09/20 9816

## 2020-09-09 NOTE — ED PROVIDER NOTES
[address] ED  EMERGENCY DEPARTMENT ENCOUNTER        Pt Name: Caryn Islas  MRN: [id number]  [place] 1945  Date of evaluation: 9/9/2020  Provider: YUKO Valentin CNP  PCP: Referring Not In System (Inactive)  ED Attending: Brock Mendoza MD    CHIEF COMPLAINT       Chief Complaint   Patient presents with    Fever     Patient arrives via EMS from the [address] [place] [address]. Patient was febrile 101.7 axillary, given Tylenol 650 mg at nursing home. Temp now 97 axillary. Patient was placed on  O2 3-4 LPM NC for SpO2 of 94%. Patient is currently 94% on room air. Patient is A& O at [facility], and is responding to voice at this time.  Shortness of Breath       HISTORY OF PRESENT ILLNESS   (Location/Symptom, Timing/Onset, Context/Setting, Quality, Duration, Modifying Factors, Severity)  Note limiting factors. Caryn Islas is a 70 y.o. male for concerns for altered mental status. Onset was today. Context includes patient was sent in from the nursing home for concerns for altered mental status and fever. Patient was provided with Tylenol in the nursing home. Patient was also found to be hypoxic in the nursing home does not use oxygen at baseline. Per nursing home report the patient is usually sitting in a chair and interactive however has been altered today. Alleviating factors include nothing. Aggravating factors include nothing. Pain is patient not able to answer. Tylenol has been used for pain today. Nursing Notes were all reviewed and agreed with or any disagreements were addressed  in the HPI. REVIEW OF SYSTEMS  (2-9 systems for level 4, 10 or more for level 5)     Review of Systems   Unable to perform ROS: Mental status change       Positivesand Pertinent negatives as per HPI. Except as noted above in the ROS, all other systems were reviewed and negative.        PAST MEDICAL HISTORY     Past Medical History:   Diagnosis Date    Back pain, chronic     Bipolar 1 disorder ([facility] [place] [address] 12/16/2010    Cancer ([address]     prostate    CVA (cerebral infarction)     brain stem CVA    Depressed     Diabetes mellitus type 2, uncontrolled (HCC)     Erosive esophagitis     GERD (gastroesophageal reflux disease)     Hydrocephalus (HCC)     Hypertension     Influenza A 7/04/41    Metabolic encephalopathy 26/08/0382    Organic affective disorder     Organic delusional disorder     Unspecified cerebral artery occlusion with cerebral infarction          SURGICAL HISTORY       Past Surgical History:   Procedure Laterality Date    COLONOSCOPY  08/26/2020    Removal polps    COLONOSCOPY N/A 8/26/2020    COLONOSCOPY POLYPECTOMY SNARE/COLD BIOPSY performed by Jacquelin Garduno MD at [facility]       Previous Medications    ACETAMINOPHEN (TYLENOL PO)    Take 650 mg by mouth 4 times daily. As needed     ALBUTEROL SULFATE HFA (VENTOLIN HFA) 108 (90 BASE) MCG/ACT INHALER    Inhale 2 puffs into the lungs every 6 hours as needed for Wheezing    AMLODIPINE (NORVASC) 10 MG TABLET    Take 1 tablet by mouth daily. ARTIFICIAL TEARS (ARTIFICIAL TEARS) OINT    Apply to eye 2 times daily Apply to left eye twice daily. ASPIRIN EC 81 MG EC TABLET    Take 81 mg by mouth daily. AZITHROMYCIN (ZITHROMAX) 250 MG TABLET    Take 250 mg by mouth daily    BISACODYL (DULCOLAX) 5 MG EC TABLET    Take 10 mg by mouth daily as needed. BISMUTH SUBSALICYLATE (PEPTO BISMOL) 262 MG/15ML SUSPENSION    Take 30 mLs by mouth every 4 hours as needed for Nausea (and vomiting)    DIVALPROEX (DEPAKOTE) 250 MG EC TABLET    Take 250 mg by mouth 2 times daily. GUAIFENESIN (ROBITUSSIN) 100 MG/5ML SOLN ORAL SOLUTION    Take 200 mg by mouth every 4 hours as needed for Cough    HYDROCHLOROTHIAZIDE (MICROZIDE) 12.5 MG CAPSULE    Take 12.5 mg by mouth daily.     INSULIN ASPART (NOVOLOG) 100 UNIT/ML INJECTION VIAL    Inject 20 Units into the skin 3 times daily (before meals)    IPRATROPIUM-ALBUTEROL (DUONEB) 0.5-2.5 (3) MG/3ML SOLN NEBULIZER SOLUTION    Inhale 1 vial into the lungs every 6 hours    LORATADINE (CLARITIN) 10 MG TABLET    Take 10 mg by mouth daily. LOSARTAN (COZAAR) 50 MG TABLET    Take 2 tablets by mouth daily    MAGNESIUM HYDROXIDE (MILK OF MAGNESIA) 400 MG/5ML SUSPENSION    Take by mouth daily as needed for Constipation    MECLIZINE (ANTIVERT) 12.5 MG TABLET    Take 12.5 mg by mouth 3 times daily as needed    METFORMIN (GLUCOPHAGE) 1000 MG TABLET    Take 1,000 mg by mouth 2 times daily (with meals)    OMEPRAZOLE (PRILOSEC) 20 MG CAPSULE    Take 20 mg by mouth daily. SENNA (SENNA CONCENTRATE) 8.6 MG TABLET    Take 2 tablets by mouth daily. SERTRALINE (ZOLOFT) 100 MG TABLET    Take 100 mg by mouth daily    SODIUM PHOSPHATES (FLEET) 7-19 GM/118ML    Place 1 enema rectally once as needed    TRAMADOL (ULTRAM) 50 MG TABLET    Take 50 mg by mouth every 6 hours as needed for Pain. ALLERGIES     Patient has no known allergies. FAMILY HISTORY     History reviewed. No pertinent family history.       SOCIAL HISTORY       Social History     Socioeconomic History    Marital status:      Spouse name: None    Number of children: None    Years of education: None    Highest education level: None   Occupational History    None   Social Needs    Financial resource strain: None    Food insecurity     Worry: None     Inability: None    Transportation needs     Medical: None     Non-medical: None   Tobacco Use    Smoking status: Never Smoker   Substance and Sexual Activity    Alcohol use: No    Drug use: No    Sexual activity: Never   Lifestyle    Physical activity     Days per week: None     Minutes per session: None    Stress: None   Relationships    Social connections     Talks on phone: None     Gets together: None     Attends Hindu service: None     Active member of club or organization: None     Attends meetings of clubs or organizations: None     Relationship status: None    Intimate partner violence     Fear of current or ex partner: None     Emotionally abused: None     Physically abused: None     Forced sexual activity: None   Other Topics Concern    None   Social History Narrative    None       SCREENINGS             PHYSICAL EXAM    (up to 7 for level 4, 8 ormore for level 5)     ED Triage Vitals   BP Temp Temp Source Pulse Resp SpO2 Height Weight   09/09/20 1316 09/09/20 1317 09/09/20 1317 09/09/20 1316 09/09/20 1316 09/09/20 1316 09/09/20 1317 09/09/20 1317   128/76 98.6 °F (37 °C) Oral 106 20 92 % 6' (1.829 m) 214 lb (97.1 kg)       Physical Exam  Constitutional:       Appearance: He is well-developed. HENT:      Head: Normocephalic and atraumatic. Eyes:      Pupils: Pupils are equal, round, and reactive to light. Neck:      Musculoskeletal: Normal range of motion. Cardiovascular:      Rate and Rhythm: Tachycardia present. Pulmonary:      Effort: Pulmonary effort is normal. No respiratory distress. Breath sounds: Rales present. Abdominal:      General: There is no distension. Palpations: Abdomen is soft. Skin:     General: Skin is warm and dry. Neurological:      Mental Status: He is unresponsive.       Comments: Does not respond to verbal stimuli however responded to a urinary catheter         DIAGNOSTIC RESULTS   LABS:    Labs Reviewed   COMPREHENSIVE METABOLIC PANEL W/ REFLEX TO MG FOR LOW K - Abnormal; Notable for the following components:       Result Value    Glucose 238 (*)     BUN 31 (*)     CREATININE 1.7 (*)     GFR Non- 40 (*)     GFR  48 (*)     ALT 55 (*)     AST 59 (*)     All other components within normal limits    Narrative:     Performed at:  [facility]) - [facility],  [place], [facility]   Phone [phone]   URINE RT REFLEX TO CULTURE - Abnormal; Notable for the following components: Clarity, UA SL CLOUDY (*)     Protein, UA 30 (*)     All other components within normal limits    Narrative:     Performed at:  [facility],  [place], [employer]   Phone [phone]   LACTIC ACID, PLASMA - Abnormal; Notable for the following components:    Lactic Acid 3.0 (*)     All other components within normal limits    Narrative:     Performed at:  [facility],  [place], [employer]   Phone [phone]   BLOOD GAS, ARTERIAL - Abnormal; Notable for the following components:    pH, Arterial 7.224 (*)     pCO2, Arterial 64.1 (*)     Base Excess, Arterial -3.1 (*)     O2 Sat, Arterial 92.2 (*)     All other components within normal limits    Narrative:     Performed at:  [facility],  [place], [employer]   Phone [phone]   MICROSCOPIC URINALYSIS - Abnormal; Notable for the following components:    Bacteria, UA 1+ (*)     All other components within normal limits    Narrative:     Performed at:  [facility],  [place], [employer]   Phone [phone]   BLOOD GAS, ARTERIAL - Abnormal; Notable for the following components:    pH, Arterial 7.195 (*)     pCO2, Arterial 61.0 (*)     Base Excess, Arterial -5.9 (*)     All other components within normal limits    Narrative:     [address] [phone],  Chemistry results called to and read back by Cortez Cobos, 09/09/2020  19:06, by ROBER  Chemistry results called to and read back by Cortez Flores [address], 09/09/2020  19:01, by ROBER  Performed at:  [facility],  [place], [employer]   Phone [phone]   CULTURE, BLOOD 1   CULTURE, BLOOD 2   CBC WITH AUTO DIFFERENTIAL    Narrative:     Performed at:  [facility]) [facility],  [place], [employer]   Phone posterior right renal cortical   cyst which contains a single thin septation with calcification. GI/Bowel: The bowel shows normal caliber and course without suspected wall   thickening.  Normal appendix.  There are few lower descending and sigmoid   colonic diverticula which show no evidence of active inflammation. Pelvis: The urinary bladder is nondistended.  The prostate gland is   surgically absent. Peritoneum/Retroperitoneum: The aorta is normal in caliber and course,   showing calcifications. Bones/Soft Tissues:   There is mild loss of height of the L1 vertebral body. No fracture line is visualized. No free fluid or adenopathy. Head CT interpreted by radiologist for  FINDINGS:   BRAIN/VENTRICLES: There is no acute intracranial hemorrhage, mass effect or   midline shift.  No abnormal extra-axial fluid collection.  The gray-white   differentiation is maintained without evidence of an acute infarct.  There is   no evidence of hydrocephalus.  Diffuse volume loss.  Old infarct in the right   frontal lobe.  Findings appear unchanged from prior study.  Pos     ORBITS: The visualized portion of the orbits demonstrate no acute abnormality. SINUSES: The visualized paranasal sinuses and mastoid air cells demonstrate   no acute abnormality. SOFT TISSUES/SKULL:  Postoperative changes occipital bone appeared. Portable chest x-ray interpreted by radiologist for    FINDINGS:   The lungs are underinflated. Alycia Congo silhouette is stable.  There are mild   linear/patchy opacities in the lung bases.  The opacity in the retrocardiac   area is increased in the interval.         Interpretation per the Radiologist below, if available at the time of this note:    CT ABDOMEN PELVIS WO CONTRAST Additional Contrast? None   Final Result   Mild loss of height of L1 vertebral body compatible with compression   fracture, likely remote. Correlation is recommended.       Fatty infiltration of the liver.         CT HEAD WO CONTRAST   Final Result   No acute intracranial abnormality. No significant change from prior study         XR CHEST PORTABLE   Final Result   Low lung volumes, resulting in limitations. Bibasilar atelectasis. Given the increased opacity in the left base,   superimposed pneumonia is considered. Ct Head Wo Contrast    Result Date: 9/9/2020  EXAMINATION: CT OF THE HEAD WITHOUT CONTRAST  9/9/2020 4:06 pm TECHNIQUE: CT of the head was performed without the administration of intravenous contrast. Dose modulation, iterative reconstruction, and/or weight based adjustment of the mA/kV was utilized to reduce the radiation dose to as low as reasonably achievable. COMPARISON: The October 6, 2014 HISTORY: ORDERING SYSTEM PROVIDED HISTORY: ams TECHNOLOGIST PROVIDED HISTORY: If patient is on cardiac monitor and/or pulse ox, they may be taken off cardiac monitor and pulse ox, left on O2 if currently on. All monitors reattached when patient returns to room. Has a \"code stroke\" or \"stroke alert\" been called? ->No Reason for exam:->ams Reason for Exam: Fever (Patient arrives via EMS from the [address] [place] [address]. Patient was febrile 101.7 axillary, given Tylenol 650 mg at nursing home. Temp now 97 axillary. Patient was placed on O2 3-4 LPM NC for SpO2 of 94%. Patient is currently 94% on room air. Patient is A& O at [facility], and is responding to voice at this time.) FINDINGS: BRAIN/VENTRICLES: There is no acute intracranial hemorrhage, mass effect or midline shift. No abnormal extra-axial fluid collection. The gray-white differentiation is maintained without evidence of an acute infarct. There is no evidence of hydrocephalus. Diffuse volume loss. Old infarct in the right frontal lobe. Findings appear unchanged from prior study. Pos ORBITS: The visualized portion of the orbits demonstrate no acute abnormality.  SINUSES: The visualized paranasal sinuses and mastoid air cells demonstrate no acute abnormality. SOFT TISSUES/SKULL:  Postoperative changes occipital bone appeared. No acute intracranial abnormality. No significant change from prior study     Xr Chest Portable    Result Date: 9/9/2020  EXAMINATION: ONE XRAY VIEW OF THE CHEST 9/9/2020 2:10 pm COMPARISON: 12/03/2016 HISTORY: ORDERING SYSTEM PROVIDED HISTORY: SOB and fever TECHNOLOGIST PROVIDED HISTORY: Reason for exam:->SOB and fever Reason for Exam: fever, SOB Acuity: Acute Type of Exam: Initial FINDINGS: The lungs are underinflated. Cardiac silhouette is stable. There are mild linear/patchy opacities in the lung bases. The opacity in the retrocardiac area is increased in the interval.     Low lung volumes, resulting in limitations. Bibasilar atelectasis. Given the increased opacity in the left base, superimposed pneumonia is considered.          PROCEDURES   Unless otherwise noted below, none     Procedures    CRITICAL CARE TIME   N/A    CONSULTS:  IP CONSULT TO HOSPITALIST  IP CONSULT TO CRITICAL CARE  IP CONSULT TO PULMONOLOGY  IP CONSULT TO PHARMACY      EMERGENCY DEPARTMENT COURSE and DIFFERENTIAL DIAGNOSIS/MDM:   Vitals:    Vitals:    09/09/20 1904 09/09/20 1914 09/09/20 1935 09/09/20 2005   BP: (!) 169/75  (!) 167/76 (!) 153/73   Pulse: 101  106 105   Resp: 20 22 20 19   Temp:       TempSrc:       SpO2: 97% 97% 97% 97%   Weight:       Height:           Patient was given the following medications:  Medications   0.9 % sodium chloride bolus (0 mL/kg × 97.1 kg Intravenous Stopped 9/9/20 2018)   vancomycin 1000 mg IVPB in 250 mL D5W addavial (0 mg Intravenous Stopped 9/9/20 2017)   piperacillin-tazobactam (ZOSYN) 3.375 g in sodium chloride 0.9 % 100 mL IVPB (mini-bag) (0 g Intravenous Stopped 9/9/20 1830)   naloxone (NARCAN) injection 0.4 mg (0.4 mg Intravenous Given 9/9/20 1715)   etomidate (AMIDATE) injection (20 mg Intravenous Given 9/9/20 1820)   succinylcholine (ANECTINE) injection (100 mg Intravenous Given 9/9/20 1821) Patient was seen and evaluated by myself Dr. Brian Latif and myself. Patient here for altered mental status. Patient was sent in from the nursing home today for concerns for altered mental status. Patient reportedly is able to sit in a chair and is interactive however today became unresponsive. He also had a temperature and a new oxygen requirement. On exam the patient is unresponsive but does respond to painful stimuli. Patient did have some increased work of breathing and congestion. Based on his fever and heart rate sepsis protocol was initiated in the ED. He was given IV antibiotics and IV fluids. Chest x-ray was concerning for bilateral atelectasis or pneumonia. Patient was treated for the pneumonia. COVID test is currently pending. Gas was obtained and was found to have a pH of 7.22 and a PCO2 of 64. However due to the patient's unresponsiveness BiPAP was not started. Patient currently is a DNR CC. A call was made to the patient's wife Angel Diaz who was provided with the status of the patient and the concerns for needing additional respiratory support. She verbalized that she would want him intubated. Dr. Brian Latif also discussed this with the wife who agreed for intubation due to his respiratory status. See procedure note Dr. Yousuf Shields notations for the intubation. Consults were placed to the hospitalist for admission however due to the inability to intubate him due to previous airway issues it was suggested the patient be sent to [facility]. Dr. Brian Latif discussed this with  and they have accepted the patient for transfer. Patient's care was transferred to [facility]. CRITICAL CARE NOTE:  There was a high probability of clinically significant life-threatening deterioration of the patient's condition requiring my urgent intervention. Total critical care time is 60 minutes.     This includes vital sign monitoring, pulse oximetry monitoring, telemetry monitoring, clinical response to the IV medications, reviewing the nursing notes, consultation time, dictation/documentation time, and interpretation of the labwork. The patient tolerated their visit well. They were seen and evaluated by the attending physician, Brock Mendoza MD who agreed with the assessment and plan. The patient and / or the family were informed of the results of any tests, a time was given to answer questions, a plan was proposed and they agreed with plan. FINAL IMPRESSION      1. Acute respiratory failure with hypoxia and hypercapnia (HCC)    2. Elevated liver function tests    3. Septicemia ([address]    4. Pneumonia due to organism    5. Altered mental status, unspecified altered mental status type    6.  Acute renal failure, unspecified acute renal failure type [facility])          DISPOSITION/PLAN   DISPOSITION Decision To Transfer 09/09/2020 08:48:41 PM      PATIENT REFERRED TO:  Referring Not In System            DISCHARGE MEDICATIONS:  New Prescriptions    No medications on file       DISCONTINUED MEDICATIONS:  Discontinued Medications    No medications on file              (Please note that portions of this note were completed with a voice recognition program.  Efforts were made to edit the dictations but occasionally words are mis-transcribed.)    Chica Corpus, APRN - CNP (electronically signed)       Chica Strong, APRN - CNP  09/09/20 2050

## 2020-09-09 NOTE — ED TRIAGE NOTES
RN took report from Karon at [facility]. Karon states that the patient was febrile, with an axillary temp of 101.7. Patient was given Tylenol 650 PO and axillary temp was 97. Patient Blood glucose was 288 and he was given 10 units of novolog. Karon states that the patient had c/o sore throat on Saturday, and a Z-Pack was started at that time. Karon states that patient is usually A&O, but with fever, patient has been very drowsy but arouses to verbal stimuli. Karon stated that the patient has Hx of stroke, and has left sided weakness at baseline. Patient is wheelchair bound, and a 1 assist for transfers. Patient is a DNR-CCA.

## 2020-09-09 NOTE — ED NOTES
Call placed to Pulm/ Critical care @ [phone]  09/09/20 8008    Dr Marichuy Monreal returned the call to Dr. Bebeto Jane @ [address]  09/09/20 9962

## 2020-09-10 NOTE — PROCEDURES
Procedure Note - Intubation: The benefits, risks, and alternatives of intubation were discussed with the patient and wife and Verbal consent was obtained via phone by talking to myself and nurse practitioner Viviana Trinidad for the procedure. Pre-oxygenation was administered and the appropriate equipment and staff were made available at the bedside. Jerilyn Ellison was sedated/paralyzed; please see the chart for the drugs and dosages administered. A glidescope was used and full view of the vocal cords was obtained. Initially tried to pass a 7.5 ET tube. This was unsuccessful, patient did have brief desaturation to 87% which improved with bagging. 7.0 ET tube attempted by myself and Dr Mitchell Solomon without success . 5.5 ET tube attempted without success. Each time, full view of the cords was obtained but the ET tube would not pass due to hitting obstruction past the vocal cords. 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 Patient did not have any further desaturations after initial attempt. After failed intubation attempts, patient was bagged while awaiting sedation and or paralytic to wear off. Patient been breathing on his own. Family updated regarding failed intubation attempts.

## 2020-09-10 NOTE — ED PROVIDER NOTES
1:01 AM: I discussed the history, physical examination, laboratory and imaging studies, and treatment plan with Dr. Demar La. Zaid Shaw was signed out to me in stable condition. Please see Dr. Clark Modest documentation for details of their history, physical, and laboratory studies. Upon re-examination, a summary of Jorge Love's history, physical examination, and studies are as follows: This is a 72-year-old male who presented with altered mental status. Etiology of this is unclear. However he is hypercapnic on his blood gas. He has had decreased responsiveness. Decision was made to intubate, however the ET tube had difficulty during intubation due to subglottic stenosis. The patient has had a prior tracheostomy. Since the patient was maintaining his airway and oxygenation, the decision was made to let RSI medications wear off, and placed him on Vapotherm. We do not have the appropriate services here if the patient were to need an emergent airway, and Dr. Demar La had spoken with  and he was admitted to intensive care unit there. The patient did not require additional care while here in the emergency department, however I did assist with arranging air care and flight so that the patient would be at a center with a higher level of care if he were to need an airway. Air care arrived and he remained hemodynamically stable.      Blanca [place]  09/10/20 6283

## 2020-09-10 NOTE — ED NOTES
Patient going to MICU bed 25 at [facility]. Call report to [phone]. Awaiting [facility] ETA.      Cass Malik  09/09/20 2028

## 2020-09-11 LAB — SARS-COV-2, NAA: DETECTED

## 2020-09-12 NOTE — ED NOTES
Received call from [facility] about positive bc. Pt was transferred to . Micro lab informed of the transfer. They are to notify .      Evelyn Edward RN  09/12/20 9735

## 2020-09-13 LAB — CULTURE, BLOOD 2: NORMAL

## 2020-09-15 LAB
BLOOD CULTURE, ROUTINE: ABNORMAL
ORGANISM: ABNORMAL

## (undated) DEVICE — ENDO CARRY-ON PROCEDURE KIT INCLUDES SUCTION TUBING, LUBRICANT, GAUZE, BIOHAZARD STICKER, TRANSPORT PAD AND INTERCEPT BEDSIDE KIT.: Brand: ENDO CARRY-ON PROCEDURE KIT

## (undated) DEVICE — ELECTRODE PT RET AD L9FT HI MOIST COND ADH HYDRGEL CORDED

## (undated) DEVICE — ELECTRODE,RADIOTRANSLUCENT,FOAM,3PK: Brand: MEDLINE

## (undated) DEVICE — Device: Brand: DISPOSABLE ELECTROSURGICAL SNARE

## (undated) DEVICE — TRAP POLYP ETRAP